# Patient Record
(demographics unavailable — no encounter records)

---

## 2024-10-22 NOTE — PHYSICAL EXAM
[FreeTextEntry1] :  Mental status: Awake, alert and oriented x3.  Recent and remote memory intact.  Naming, repetition and comprehension intact.  Attention/concentration intact.  No dysarthria, no aphasia.  Fund of knowledge appropriate.  Cranial nerves: Pupils equally round and reactive to light, visual fields full, no nystagmus, extraocular muscles intact, V1 through V3 intact bilaterally and symmetric, face symmetric, hearing intact to finger rub, palate elevation symmetric, tongue was midline.  Motor:  MRC grading 5/5 b/l UE/LE.   strength 5/5.  Normal tone and bulk.  No abnormal movements.  Sensation: Intact to light touch  Coordination: No dysmetria on finger-to-nose  Reflexes: 2+ in bilateral UE/LE  Gait: Narrow and steady. No ataxia.  Romberg negative

## 2024-10-22 NOTE — HISTORY OF PRESENT ILLNESS
[FreeTextEntry1] : Ms. Prince is a 51-year-old female past medical history of hypertension, HLD, and anemia presents today for a follow up visit after presented to the ED with headaches. Patient states she has intermittent headache for the past couple months sometimes associated with photophobia, phonophobia, and sensitivity to smell. Headaches are described as throbbing pain and the pain is located in different areas, sometimes frontal and sometimes the sides of the face. Patient states she usually takes Excedrin PRN for these headaches which help the pain improve. The day she presented to the ED, the headache and associated symptoms of photophobia, phonophobia, and sensitivity to smell were so intense, she also vomited x 1. Her BP was also noted to be high, fluctuating with a systolic of 120-160, therefore she presented to the ED. Denies fever, vision change, neck pain, chest pain, shortness of breath, weakness, numbness, vomiting, diarrhea.

## 2024-10-22 NOTE — ASSESSMENT
[FreeTextEntry1] : Ms. Prince is a 51-year-old female past medical history of hypertension, HLD, and anemia presents today for a follow up visit after presented to the ED with headaches associated with photophobia, phonophobia, and sensitivity to smell. recent episode was also associated with high BP and vomiting x 1. Symptoms are likely related to migraines. Would like to send patient for MRI head w/o to r/o any structural cause of headaches.   Plan: Start Rizatriptan 5 mg for abortive therapy  limit Excedrin use MRI head w/o  BP control: f/u PCP RTC 4 months

## 2025-03-05 NOTE — ASSESSMENT
[FreeTextEntry1] : Ms. Prince is a 51-year-old female past medical history of hypertension, HLD, and anemia presents today for a follow up visit for migraines. Well controlled on triptans but due to reporting BP has been uncontrolled, triptans are now contraindicated. will switch patient to ubrelvy 50 mg PRN.    Plan: d/c rizatriptan due to uncontrolled BP start ubrelvy 50 mg PRN as abortive limit Excedrin use BP control: f/u PCP RTC 6 months.

## 2025-03-05 NOTE — HISTORY OF PRESENT ILLNESS
[FreeTextEntry1] : Ms. Prince is a 51-year-old female past medical history of hypertension, HLD, and anemia presents today for a follow up visit for migraines. Patient is doing well on rizatriptan 5 mg PRN as abortive. endorses her migraines are only occurring twice a month. She followed up with her PCP in regard to her BP and they made some adjustments to her medications. When asked if her BP has been controlled, she endorses it is not always controlled.  MRI head reviewed and showed no acute pathology

## 2025-03-05 NOTE — PHYSICAL EXAM
[FreeTextEntry1] :  Mental status: Awake, alert and oriented x3.  Recent and remote memory intact.  Naming, repetition and comprehension intact.  Attention/concentration intact.  No dysarthria, no aphasia.  Fund of knowledge appropriate.  Cranial nerves: Pupils equally round and reactive to light, visual fields full, no nystagmus, extraocular muscles intact, V1 through V3 intact bilaterally and symmetric, face symmetric, hearing intact to finger rub, palate elevation symmetric, tongue was midline.  Motor:  MRC grading 5/5 b/l UE/LE.   strength 5/5.  Normal tone and bulk.  No abnormal movements.  Sensation: Intact to light touch, proprioception, and pinprick.  Coordination: No dysmetria on finger-to-nose and heel-to-shin.  No dysdiadokinesia.  Reflexes: 2+ in bilateral UE/LE, downgoing toes bilaterally. (-) Malloy.  Gait: Narrow and steady. No ataxia.  Romberg negative

## 2025-06-03 NOTE — HISTORY OF PRESENT ILLNESS
[Y] : Patient is sexually active [TextBox_4] : GYNHX history of fibroids small , no, cysts, or STDs  last menses 12/24 last pap  4/2024 wnl [PGHxTotal] : 3 [PGxPremature] : 2 [[facility] : 2 [PGHxABSpont] : 1 [FreeTextEntry1] : c-sections

## 2025-06-03 NOTE — DISCUSSION/SUMMARY
[FreeTextEntry1] : 53 yo  annual gyn, menopausal symptoms  pap hpv  mammo pelvic sono- several small myomas, [id number], enod 5 mm, nl ovaries, small cysts simple estr pach  pg 100 qhs Testosterone  cream 2mg/1 gm topical , 4 clicks daily rto 3 mo

## 2025-06-03 NOTE — PHYSICAL EXAM
[Chaperone Declined] : Chaperone offered however refused by patient, [Appropriately responsive] : appropriately responsive [Alert] : alert [No Acute Distress] : no acute distress [No Lymphadenopathy] : no lymphadenopathy [Soft] : soft [Non-tender] : non-tender [Non-distended] : non-distended [No HSM] : No HSM [No Lesions] : no lesions [No Mass] : no mass [Oriented x3] : oriented x3 [Examination Of The Breasts] : a normal appearance [No Discharge] : no discharge [No Masses] : no breast masses were palpable [Labia Majora] : normal [Labia Minora] : normal [Normal] : normal [Uterine Adnexae] : normal [FreeTextEntry6] : wnl

## 2025-06-03 NOTE — PROCEDURE
[Cervical Pap Smear] : cervical Pap smear [Liquid Base] : liquid base [Tolerated Well] : the patient tolerated the procedure well [No Complications] : there were no complications [Amenorrhea] : Amenorrhea [Transvaginal Ultrasound] : transvaginal ultrasound [FreeTextEntry4] : Several fibroids largest 4.4 x 4 x 3.7 subserosal,  Endometrium 5.7 Normal ovaries small simple cyst